# Patient Record
Sex: FEMALE | Employment: PART TIME | URBAN - METROPOLITAN AREA
[De-identification: names, ages, dates, MRNs, and addresses within clinical notes are randomized per-mention and may not be internally consistent; named-entity substitution may affect disease eponyms.]

---

## 2017-08-29 PROCEDURE — 88175 CYTOPATH C/V AUTO FLUID REDO: CPT | Performed by: OBSTETRICS & GYNECOLOGY

## 2018-04-17 PROBLEM — M25.511 CHRONIC RIGHT SHOULDER PAIN: Status: ACTIVE | Noted: 2018-04-17

## 2018-04-17 PROBLEM — G89.29 CHRONIC RIGHT SHOULDER PAIN: Status: ACTIVE | Noted: 2018-04-17

## 2019-04-18 PROCEDURE — 88175 CYTOPATH C/V AUTO FLUID REDO: CPT | Performed by: OBSTETRICS & GYNECOLOGY

## 2021-04-07 ENCOUNTER — HOSPITAL ENCOUNTER (OUTPATIENT)
Dept: GENERAL RADIOLOGY | Facility: HOSPITAL | Age: 61
Discharge: HOME | End: 2021-04-07
Attending: PREVENTIVE MEDICINE

## 2021-04-07 ENCOUNTER — OCC HEALTH (OUTPATIENT)
Dept: OTHER | Facility: HOSPITAL | Age: 61
End: 2021-04-07
Attending: PREVENTIVE MEDICINE

## 2021-04-07 DIAGNOSIS — M79.642 LEFT HAND PAIN: Primary | ICD-10-CM

## 2021-04-07 DIAGNOSIS — M79.642 LEFT HAND PAIN: ICD-10-CM

## 2021-04-07 PROCEDURE — 73130 X-RAY EXAM OF HAND: CPT | Performed by: PREVENTIVE MEDICINE

## 2021-04-14 ENCOUNTER — APPOINTMENT (OUTPATIENT)
Dept: OTHER | Facility: HOSPITAL | Age: 61
End: 2021-04-14
Attending: PREVENTIVE MEDICINE

## 2021-04-21 ENCOUNTER — APPOINTMENT (OUTPATIENT)
Dept: OTHER | Facility: HOSPITAL | Age: 61
End: 2021-04-21
Attending: PREVENTIVE MEDICINE

## 2021-06-03 ENCOUNTER — OFFICE VISIT (OUTPATIENT)
Dept: ORTHOPEDICS CLINIC | Facility: CLINIC | Age: 61
End: 2021-06-03

## 2021-06-03 VITALS — HEART RATE: 73 BPM | OXYGEN SATURATION: 100 %

## 2021-06-03 DIAGNOSIS — M65.9 SYNOVITIS OF LEFT HAND: Primary | ICD-10-CM

## 2021-06-03 PROCEDURE — 20600 DRAIN/INJ JOINT/BURSA W/O US: CPT | Performed by: ORTHOPAEDIC SURGERY

## 2021-06-03 PROCEDURE — 99203 OFFICE O/P NEW LOW 30 MIN: CPT | Performed by: ORTHOPAEDIC SURGERY

## 2021-06-03 RX ORDER — TRIAMCINOLONE ACETONIDE 40 MG/ML
40 INJECTION, SUSPENSION INTRA-ARTICULAR; INTRAMUSCULAR ONCE
Status: COMPLETED | OUTPATIENT
Start: 2021-06-03 | End: 2021-06-03

## 2021-06-03 RX ADMIN — TRIAMCINOLONE ACETONIDE 40 MG: 40 INJECTION, SUSPENSION INTRA-ARTICULAR; INTRAMUSCULAR at 12:32:00

## 2021-06-03 NOTE — H&P
[facility] Note    CC: Left thumb pain    HPI: This 64year old female with left thumb pain secondary to injury while at work. Patient went to squeeze something and had immediate severe pain in the base of the thumb.   She is tried conservative manag photophobia, pain and visual disturbance. Respiratory: Negative for cough, shortness of breath and wheezing. Cardiovascular: Negative for chest pain, palpitations and leg swelling. Gastrointestinal: Negative for abdominal pain, nausea and vomiting. with left thumb CMC joint synovitis. I think a corticosteroid injection is a reasonable next step seen in the more conservative nonsurgical options have failed. Patient agreed to do [facility] joint corticosteroid injection.  Patient will follow up on an as-need

## 2021-06-03 NOTE — PROCEDURES
[facility] Joint Injection:    Written consent was obtained. Skin was prepped with ChloraPrep.  2 mL mixture of 1 mL of 40 mg of Kenalog and 1 mL of 1% lidocaine was injected into the left CMC joint. Patient tolerated the procedure.   No complications were encou

## 2021-06-17 ENCOUNTER — TELEPHONE (OUTPATIENT)
Dept: ORTHOPEDICS CLINIC | Facility: CLINIC | Age: 61
End: 2021-06-17

## 2021-06-17 NOTE — TELEPHONE ENCOUNTER
Request received for 6/3/21.     Faxed notes to workers comp  at [phone]. Received fax confirmation.